# Patient Record
Sex: MALE | Race: BLACK OR AFRICAN AMERICAN
[De-identification: names, ages, dates, MRNs, and addresses within clinical notes are randomized per-mention and may not be internally consistent; named-entity substitution may affect disease eponyms.]

---

## 2018-09-30 ENCOUNTER — HOSPITAL ENCOUNTER (EMERGENCY)
Dept: HOSPITAL 72 - EMR | Age: 23
Discharge: HOME | End: 2018-09-30
Payer: MEDICAID

## 2018-09-30 VITALS — DIASTOLIC BLOOD PRESSURE: 66 MMHG | SYSTOLIC BLOOD PRESSURE: 102 MMHG

## 2018-09-30 VITALS — BODY MASS INDEX: 28.93 KG/M2 | HEIGHT: 66 IN | WEIGHT: 180 LBS

## 2018-09-30 VITALS — DIASTOLIC BLOOD PRESSURE: 80 MMHG | SYSTOLIC BLOOD PRESSURE: 138 MMHG

## 2018-09-30 DIAGNOSIS — K52.9: Primary | ICD-10-CM

## 2018-09-30 LAB
ADD MANUAL DIFF: NO
ALBUMIN SERPL-MCNC: 4.5 G/DL (ref 3.4–5)
ALBUMIN/GLOB SERPL: 1.2 {RATIO} (ref 1–2.7)
ALP SERPL-CCNC: 104 U/L (ref 46–116)
ALT SERPL-CCNC: 36 U/L (ref 12–78)
ANION GAP SERPL CALC-SCNC: 11 MMOL/L (ref 5–15)
APPEARANCE UR: CLEAR
APTT PPP: (no result) S
AST SERPL-CCNC: 20 U/L (ref 15–37)
BASOPHILS NFR BLD AUTO: 0.7 % (ref 0–2)
BILIRUB SERPL-MCNC: 0.8 MG/DL (ref 0.2–1)
BUN SERPL-MCNC: 22 MG/DL (ref 7–18)
CALCIUM SERPL-MCNC: 9.9 MG/DL (ref 8.5–10.1)
CHLORIDE SERPL-SCNC: 101 MMOL/L (ref 98–107)
CO2 SERPL-SCNC: 26 MMOL/L (ref 21–32)
CREAT SERPL-MCNC: 1.2 MG/DL (ref 0.55–1.3)
EOSINOPHIL NFR BLD AUTO: 1.5 % (ref 0–3)
ERYTHROCYTE [DISTWIDTH] IN BLOOD BY AUTOMATED COUNT: 10.4 % (ref 11.6–14.8)
GLOBULIN SER-MCNC: 3.8 G/DL
GLUCOSE UR STRIP-MCNC: NEGATIVE MG/DL
HCT VFR BLD CALC: 52.8 % (ref 42–52)
HGB BLD-MCNC: 17.3 G/DL (ref 14.2–18)
KETONES UR QL STRIP: NEGATIVE
LEUKOCYTE ESTERASE UR QL STRIP: (no result)
LYMPHOCYTES NFR BLD AUTO: 7 % (ref 20–45)
MCV RBC AUTO: 84 FL (ref 80–99)
MONOCYTES NFR BLD AUTO: 6.6 % (ref 1–10)
NEUTROPHILS NFR BLD AUTO: 84.3 % (ref 45–75)
NITRITE UR QL STRIP: NEGATIVE
PH UR STRIP: 9 [PH] (ref 4.5–8)
PLATELET # BLD: 269 K/UL (ref 150–450)
POTASSIUM SERPL-SCNC: 4.1 MMOL/L (ref 3.5–5.1)
PROT UR QL STRIP: (no result)
RBC # BLD AUTO: 6.31 M/UL (ref 4.7–6.1)
SODIUM SERPL-SCNC: 138 MMOL/L (ref 136–145)
SP GR UR STRIP: 1.01 (ref 1–1.03)
UROBILINOGEN UR-MCNC: NORMAL MG/DL (ref 0–1)
WBC # BLD AUTO: 9.4 K/UL (ref 4.8–10.8)

## 2018-09-30 PROCEDURE — 99284 EMERGENCY DEPT VISIT MOD MDM: CPT

## 2018-09-30 PROCEDURE — 85025 COMPLETE CBC W/AUTO DIFF WBC: CPT

## 2018-09-30 PROCEDURE — 80053 COMPREHEN METABOLIC PANEL: CPT

## 2018-09-30 PROCEDURE — 36415 COLL VENOUS BLD VENIPUNCTURE: CPT

## 2018-09-30 PROCEDURE — 81003 URINALYSIS AUTO W/O SCOPE: CPT

## 2018-09-30 PROCEDURE — 83690 ASSAY OF LIPASE: CPT

## 2018-09-30 PROCEDURE — 96374 THER/PROPH/DIAG INJ IV PUSH: CPT

## 2018-09-30 PROCEDURE — 96375 TX/PRO/DX INJ NEW DRUG ADDON: CPT

## 2018-09-30 PROCEDURE — 96361 HYDRATE IV INFUSION ADD-ON: CPT

## 2018-09-30 NOTE — EMERGENCY ROOM REPORT
History of Present Illness


General


Chief Complaint:  Abdominal Pain


Source:  Patient





Present Illness


HPI


23-year-old male presents ED for evaluation.  Complaining of abdominal pain 

with vomiting and diarrhea.  Started 2 days ago.  Pain is cramping, 9 out of 10

, nonradiating.  No focal episodes of vomiting and diarrhea.  Denies fevers or 

chills.  Denies recent travel.  Denies recent antibiotic use.  No other 

aggravating relieving factors.  Denies any other associated symptoms


Allergies:  


Coded Allergies:  


     No Known Allergies (Unverified , 9/30/18)





Patient History


Past Medical History:  none


Past Surgical History:  none


Pertinent Family History:  none


Social History:  Denies: smoking, alcohol use, drug use


Immunizations:  UTD


Reviewed Nursing Documentation:  PMH: Agreed; PSxH: Agreed





Nursing Documentation-PMH


Past Medical History:  No Stated History





Review of Systems


All Other Systems:  negative except mentioned in HPI





Physical Exam





Vital Signs








  Date Time  Temp Pulse Resp B/P (MAP) Pulse Ox O2 Delivery O2 Flow Rate FiO2


 


9/30/18 10:18 98.5 103 20 138/80 96 Room Air  





 98.4       








Sp02 EP Interpretation:  reviewed, normal


General Appearance:  no apparent distress, alert, GCS 15, non-toxic


Head:  normocephalic, atraumatic


Eyes:  bilateral eye normal inspection, bilateral eye PERRL


ENT:  hearing grossly normal, normal pharynx, no angioedema, normal voice


Neck:  full range of motion, supple/symm/no masses


Respiratory:  chest non-tender, lungs clear, normal breath sounds, speaking 

full sentences


Cardiovascular #1:  regular rate, rhythm, no edema


Cardiovascular #2:  2+ carotid (R), 2+ carotid (L), 2+ radial (R), 2+ radial (L)

, 2+ dorsalis pedis (R), 2+ dorsalis pedis (L)


Gastrointestinal:  normal bowel sounds, soft, non-distended, no guarding, no 

rebound, tenderness


Rectal:  deferred


Genitourinary:  normal inspection, no CVA tenderness


Musculoskeletal:  back normal, gait/station normal, normal range of motion, non-

tender


Neurologic:  alert, oriented x3, responsive, motor strength/tone normal, 

sensory intact, speech normal


Psychiatric:  judgement/insight normal, memory normal, mood/affect normal, no 

suicidal/homicidal ideation


Reflexes:  3+ bicep (R), 3+ bicep (L), 3+ tricep (R), 3+ tricep (L), 3+ knee (R)

, 3+ knee (L)


Skin:  normal color, no rash, warm/dry, well hydrated


Lymphatic:  no adenopathy





Medical Decision Making


Diagnostic Impression:  


 Primary Impression:  


 Acute gastroenteritis


ER Course


Hospital Course 


23-year-old M presents to ED with cramping abdominal pain with vomiting, 

diarrhea 





differential diagnosis: gastritis, SBO, cholecystits, gastroenteritis  





Clinical course


Patient placed on stretcher.  On cardiac monitor.  After initial history and 

physical I ordered labs, IV fluids, zofran, pepcid, GI cocktail





Labs - no leukocytosis, electrolytes ok, LFTs normal





Upon reassessment, patient states pain has improved.  findings consistent with 

gastroenteritis 





Discussed findings with patient, mother.  Mother states that patient has been 

having these problems for many years now.  Patient was told by PMD that he may 

need a colonoscopy.  I will provide GI referral





I feel this is a highly complex case requiring extensive working including EKG/

Rhythm strip, Xray/CT/US, Blood/urine lab work, repeat exams while in ED, and 

administration of strong opiates/narcotics for pain control, admission to 

hospital or close patient follow up.  





Diagnosis - gastroenteritis 





Stable and discharged to home with prescriptions for Zantac, zofran, bentyl.  

Followup with PMD.  Return to ED if symptoms recur or worsen





Labs








Test


  9/30/18


10:31


 


White Blood Count


  9.4 K/UL


(4.8-10.8)


 


Red Blood Count


  6.31 M/UL


(4.70-6.10)


 


Hemoglobin


  17.3 G/DL


(14.2-18.0)


 


Hematocrit


  52.8 %


(42.0-52.0)


 


Mean Corpuscular Volume 84 FL (80-99) 


 


Mean Corpuscular Hemoglobin


  27.5 PG


(27.0-31.0)


 


Mean Corpuscular Hemoglobin


Concent 32.8 G/DL


(32.0-36.0)


 


Red Cell Distribution Width


  10.4 %


(11.6-14.8)


 


Platelet Count


  269 K/UL


(150-450)


 


Mean Platelet Volume


  6.9 FL


(6.5-10.1)


 


Neutrophils (%) (Auto)


  84.3 %


(45.0-75.0)


 


Lymphocytes (%) (Auto)


  7.0 %


(20.0-45.0)


 


Monocytes (%) (Auto)


  6.6 %


(1.0-10.0)


 


Eosinophils (%) (Auto)


  1.5 %


(0.0-3.0)


 


Basophils (%) (Auto)


  0.7 %


(0.0-2.0)


 


Urine Color Pale yellow 


 


Urine Appearance Clear 


 


Urine pH 9 (4.5-8.0) 


 


Urine Specific Gravity


  1.015


(1.005-1.035)


 


Urine Protein 1+ (NEGATIVE) 


 


Urine Glucose (UA)


  Negative


(NEGATIVE)


 


Urine Ketones


  Negative


(NEGATIVE)


 


Urine Blood


  Negative


(NEGATIVE)


 


Urine Nitrite


  Negative


(NEGATIVE)


 


Urine Bilirubin


  Negative


(NEGATIVE)


 


Urine Urobilinogen


  Normal MG/DL


(0.0-1.0)


 


Urine Leukocyte Esterase 1+ (NEGATIVE) 


 


Urine RBC 0 /HPF (0 - 0) 


 


Urine WBC


  0-2 /HPF (0 -


0)


 


Urine Squamous Epithelial


Cells Occasional


/LPF


 


Urine Bacteria


  Occasional


/HPF (NONE)


 


Sodium Level


  138 MMOL/L


(136-145)


 


Potassium Level


  4.1 MMOL/L


(3.5-5.1)


 


Chloride Level


  101 MMOL/L


()


 


Carbon Dioxide Level


  26 MMOL/L


(21-32)


 


Anion Gap


  11 mmol/L


(5-15)


 


Blood Urea Nitrogen


  22 mg/dL


(7-18)


 


Creatinine


  1.2 MG/DL


(0.55-1.30)


 


Estimat Glomerular Filtration


Rate > 60 mL/min


(>60)


 


Glucose Level


  114 MG/DL


()


 


Calcium Level


  9.9 MG/DL


(8.5-10.1)


 


Total Bilirubin


  0.8 MG/DL


(0.2-1.0)


 


Aspartate Amino Transf


(AST/SGOT) 20 U/L (15-37) 


 


 


Alanine Aminotransferase


(ALT/SGPT) 36 U/L (12-78) 


 


 


Alkaline Phosphatase


  104 U/L


()


 


Total Protein


  8.3 G/DL


(6.4-8.2)


 


Albumin


  4.5 G/DL


(3.4-5.0)


 


Globulin 3.8 g/dL 


 


Albumin/Globulin Ratio 1.2 (1.0-2.7) 


 


Lipase


  158 U/L


()











Last Vital Signs








  Date Time  Temp Pulse Resp B/P (MAP) Pulse Ox O2 Delivery O2 Flow Rate FiO2


 


9/30/18 11:24 98.4 96 20 102/66 100 Room Air  





 98.4       








Status:  improved


Disposition:  HOME, SELF-CARE


Condition:  Stable


Scripts


Ranitidine Hcl* (ZANTAC*) 150 Mg Tablet


150 MG ORAL TWICE A DAY, #30 TAB


   Prov: Yohan Gongora MD         9/30/18 


Ondansetron Odt* (ZOFRAN ODT*) 4 Mg Tab.rapdis


4 MG BC EVERY 6 HOURS PRN for Nausea & Vomiting, #30 TAB 0 Refills


   Prov: Yohan Gongora MD         9/30/18 


Dicyclomine Hcl* (DICYCLOMINE HCL*) 10 Mg Capsule


10 MG PO QID for 7 Days, CAP


   Prov: Yohan Gongora MD         9/30/18


Referrals:  


Marni Jara MD











NON PHYSICIAN (PCP)











Charles Duvall MD


Patient Instructions:  Viral Gastroenteritis, Adult, Easy-to-Read











Yohan Gongora MD Sep 30, 2018 13:04

## 2019-08-09 ENCOUNTER — HOSPITAL ENCOUNTER (EMERGENCY)
Dept: HOSPITAL 10 - FTE | Age: 24
Discharge: HOME | End: 2019-08-09
Payer: COMMERCIAL

## 2019-08-09 ENCOUNTER — HOSPITAL ENCOUNTER (EMERGENCY)
Dept: HOSPITAL 91 - FTE | Age: 24
Discharge: HOME | End: 2019-08-09
Payer: COMMERCIAL

## 2019-08-09 VITALS
HEIGHT: 67 IN | BODY MASS INDEX: 34.67 KG/M2 | WEIGHT: 220.9 LBS | HEIGHT: 67 IN | WEIGHT: 220.9 LBS | BODY MASS INDEX: 34.67 KG/M2

## 2019-08-09 VITALS — SYSTOLIC BLOOD PRESSURE: 127 MMHG | DIASTOLIC BLOOD PRESSURE: 77 MMHG | RESPIRATION RATE: 18 BRPM | HEART RATE: 71 BPM

## 2019-08-09 DIAGNOSIS — K59.00: ICD-10-CM

## 2019-08-09 DIAGNOSIS — K21.9: Primary | ICD-10-CM

## 2019-08-09 LAB
ADD MAN DIFF?: NO
ALANINE AMINOTRANSFERASE: 68 IU/L (ref 13–69)
ALBUMIN/GLOBULIN RATIO: 1.37
ALBUMIN: 4.8 G/DL (ref 3.3–4.9)
ALKALINE PHOSPHATASE: 93 IU/L (ref 42–121)
AMYLASE: 125 U/L (ref 11–123)
ANION GAP: 10 (ref 5–13)
ASPARTATE AMINO TRANSFERASE: 109 IU/L (ref 15–46)
BASOPHIL #: 0 10^3/UL (ref 0–0.1)
BASOPHILS %: 0.5 % (ref 0–2)
BILIRUBIN,DIRECT: 0 MG/DL (ref 0–0.2)
BILIRUBIN,TOTAL: 0.4 MG/DL (ref 0.2–1.3)
BLOOD UREA NITROGEN: 14 MG/DL (ref 7–20)
CALCIUM: 10.4 MG/DL (ref 8.4–10.2)
CARBON DIOXIDE: 29 MMOL/L (ref 21–31)
CHLORIDE: 102 MMOL/L (ref 97–110)
CREATININE: 1.24 MG/DL (ref 0.61–1.24)
EOSINOPHILS #: 0.4 10^3/UL (ref 0–0.5)
EOSINOPHILS %: 5.8 % (ref 0–7)
GLOBULIN: 3.5 G/DL (ref 1.3–3.2)
GLUCOSE: 94 MG/DL (ref 70–220)
HEMATOCRIT: 47.7 % (ref 42–52)
HEMOGLOBIN: 15.7 G/DL (ref 14–18)
IMMATURE GRANS #M: 0.01 10^3/UL (ref 0–0.03)
IMMATURE GRANS % (M): 0.2 % (ref 0–0.43)
LIPASE: 76 U/L (ref 23–300)
LYMPHOCYTES #: 2.9 10^3/UL (ref 0.8–2.9)
LYMPHOCYTES %: 46.8 % (ref 15–51)
MEAN CORPUSCULAR HEMOGLOBIN: 28 PG (ref 29–33)
MEAN CORPUSCULAR HGB CONC: 32.9 G/DL (ref 32–37)
MEAN CORPUSCULAR VOLUME: 85 FL (ref 82–101)
MEAN PLATELET VOLUME: 10.4 FL (ref 7.4–10.4)
MONOCYTE #: 0.7 10^3/UL (ref 0.3–0.9)
MONOCYTES %: 10.7 % (ref 0–11)
NEUTROPHIL #: 2.2 10^3/UL (ref 1.6–7.5)
NEUTROPHILS %: 36 % (ref 39–77)
NUCLEATED RED BLOOD CELLS #: 0 10^3/UL (ref 0–0)
NUCLEATED RED BLOOD CELLS%: 0 /100WBC (ref 0–0)
OCCULT BLOOD STOOL: NEGATIVE
PLATELET COUNT: 267 10^3/UL (ref 140–415)
POTASSIUM: 4.3 MMOL/L (ref 3.5–5.1)
RED BLOOD COUNT: 5.61 10^6/UL (ref 4.7–6.1)
RED CELL DISTRIBUTION WIDTH: 12 % (ref 11.5–14.5)
SODIUM: 141 MMOL/L (ref 135–144)
TOTAL PROTEIN: 8.3 G/DL (ref 6.1–8.1)
WHITE BLOOD COUNT: 6.2 10^3/UL (ref 4.8–10.8)

## 2019-08-09 PROCEDURE — 80053 COMPREHEN METABOLIC PANEL: CPT

## 2019-08-09 PROCEDURE — 96361 HYDRATE IV INFUSION ADD-ON: CPT

## 2019-08-09 PROCEDURE — 82270 OCCULT BLOOD FECES: CPT

## 2019-08-09 PROCEDURE — 96374 THER/PROPH/DIAG INJ IV PUSH: CPT

## 2019-08-09 PROCEDURE — 82150 ASSAY OF AMYLASE: CPT

## 2019-08-09 PROCEDURE — 85025 COMPLETE CBC W/AUTO DIFF WBC: CPT

## 2019-08-09 PROCEDURE — 83690 ASSAY OF LIPASE: CPT

## 2019-08-09 PROCEDURE — 36415 COLL VENOUS BLD VENIPUNCTURE: CPT

## 2019-08-09 PROCEDURE — 99284 EMERGENCY DEPT VISIT MOD MDM: CPT

## 2019-08-09 RX ADMIN — ALUMINUM HYDROXIDE, MAGNESIUM HYDROXIDE, DIMETHICONE 1 ML: 200; 200; 20 SUSPENSION ORAL at 19:23

## 2019-08-09 RX ADMIN — FAMOTIDINE 1 MG: 10 INJECTION, SOLUTION INTRAVENOUS at 19:23

## 2019-08-09 RX ADMIN — THIAMINE HYDROCHLORIDE 1 MLS/HR: 100 INJECTION, SOLUTION INTRAMUSCULAR; INTRAVENOUS at 19:38

## 2019-08-09 NOTE — ERD
ER Documentation


Chief Complaint


Chief Complaint





ABODINAL PAIN W/RECTAL BLEEDING SINCE 1500





HPI


This is a 24-year-old male with history of GERD presents to the ED complaining 


of "burning" mid epigastric and lower abdominal pain for the past several days. 


He states he has a history of GERD and had an endoscopy over a month ago by his 


GI specialist, he was started on omeprazole for this.  He states he has a 


history of constipation and usually strains when making bowel movements.  He 


states he was making a bowel movement today when he noticed bright red blood on 


toilet paper.  He was concerned about this so he came here for further 


evaluation.   Denies any recent Pepto-Bismol use.  Denies hematochezia or 


melena.  No hematemesis, nausea or vomiting.





ROS


All systems reviewed and are negative except as per history of present illness.





Medications


Home Meds


Active Scripts


Docusate Sodium* (Colace*) 100 Mg Capsule, 100 MG PO TID, #30 CAP


   Prov:ZOILAIGRCHELSY SHAY-C         8/9/19


Hydrocortisone Acetate (Anusol-Hc) 25 Mg Supp.rect, 1 SUPP OR BID PRN for 


HEMORROID PAIN/ITCHING, #12 SUPP.RECT


   Prov:CHELSY AGUAYO PA-C         8/9/19





Allergies


Allergies:  


Coded Allergies:  


     No Known Allergy (Unverified , 8/9/19)





PMhx/Soc


Medical and Surgical Hx:  pt denies Surgical Hx


History of Surgery:  No


Anesthesia Reaction:  No


Hx Neurological Disorder:  No


Hx Respiratory Disorders:  No


Hx Cardiac Disorders:  No


Hx Psychiatric Problems:  No


Hx Miscellaneous Medical Probl:  No


Hx Alcohol Use:  No


Hx Substance Use:  No


Hx Tobacco Use:  No


Smoking Status:  Never smoker





FmHx


Family History:  No diabetes





Physical Exam


Vitals





Vital Signs


  Date      Temp  Pulse  Resp  B/P (MAP)   Pulse Ox  O2          O2 Flow    FiO2


Time                                                 Delivery    Rate


    8/9/19  99.1     84    18      167/86        97


     18:20                          (113)





Physical Exam


Const:   No acute distress


Head:   Atraumatic 


Eyes:    Normal Conjunctiva


ENT:    Normal External Ears, Nose and Mouth.


Neck:               Full range of motion. No meningismus.


Resp:   Clear to auscultation bilaterally


Cardio:   Regular rate and rhythm, no murmurs


Abd:    Soft,+ diffuse abdominal tenderness, especially to the mid epigastric 


and left upper quadrant region.  No rebound, no guarding.  Negative McBurney's, 


negative Germain's. non distended. Normal bowel sounds


 Rectal Exam: Normal tone, No mass, Positive control, no external hemorrhoids, 


no anal fissures


 Stool:    Brown 


 Guaiac:   Negative


Skin:   No petechiae or rashes


Back:   No midline or flank tenderness


Ext:    No cyanosis, or edema


Neur:   Awake and alert


Psych:    Normal Mood and Affect


Result Diagram:  


8/9/19 1920 8/9/19 1920





Results 24 hrs





Laboratory Tests


       Test
                                  8/9/19
19:20  8/9/19
19:30


       White Blood Count                      6.2 10^3/ul


       Red Blood Count                       5.61 10^6/ul


       Hemoglobin                               15.7 g/dl


       Hematocrit                                  47.7 %


       Mean Corpuscular Volume                    85.0 fl


       Mean Corpuscular Hemoglobin                28.0 pg


       Mean Corpuscular Hemoglobin
Concent     32.9 g/dl 
  



       Red Cell Distribution Width                 12.0 %


       Platelet Count                         267 10^3/UL


       Mean Platelet Volume                       10.4 fl


       Immature Granulocytes %                    0.200 %


       Neutrophils %                               36.0 %


       Lymphocytes %                               46.8 %


       Monocytes %                                 10.7 %


       Eosinophils %                                5.8 %


       Basophils %                                  0.5 %


       Nucleated Red Blood Cells %            0.0 /100WBC


       Immature Granulocytes #              0.010 10^3/ul


       Neutrophils #                          2.2 10^3/ul


       Lymphocytes #                          2.9 10^3/ul


       Monocytes #                            0.7 10^3/ul


       Eosinophils #                          0.4 10^3/ul


       Basophils #                            0.0 10^3/ul


       Nucleated Red Blood Cells #            0.0 10^3/ul


       Sodium Level                            141 mmol/L


       Potassium Level                         4.3 mmol/L


       Chloride Level                          102 mmol/L


       Carbon Dioxide Level                     29 mmol/L


       Anion Gap                                       10


       Blood Urea Nitrogen                       14 mg/dl


       Creatinine                              1.24 mg/dl


       Est Glomerular Filtrat Rate
mL/min   > 60 mL/min 
   



       Glucose Level                             94 mg/dl


       Calcium Level                           10.4 mg/dl


       Total Bilirubin                          0.4 mg/dl


       Direct Bilirubin                        0.00 mg/dl


       Indirect Bilirubin                       0.4 mg/dl


       Aspartate Amino Transf
(AST/SGOT)        109 IU/L 
  



       Alanine Aminotransferase
(ALT/SGPT)       68 IU/L 
  



       Alkaline Phosphatase                       93 IU/L


       Total Protein                             8.3 g/dl


       Albumin                                   4.8 g/dl


       Globulin                                 3.50 g/dl


       Albumin/Globulin Ratio                        1.37


       Amylase Level                              125 U/L


       Lipase                                      76 U/L


       Stool Occult Blood                                   NEGATIVE





Current Medications


 Medications
   Dose
          Sig/Jaime
       Start Time
   Status  Last


 (Trade)       Ordered        Route
 PRN     Stop Time              Admin
Dose


                              Reason                                Admin


 Sodium         100 ml @ 
     Q1H STAT
      8/9/19        DC       



Chloride       100 mls/hr     IV
            18:53
 8/9/19


                                             19:35


 Famotidine
    20 mg          ONCE  STAT
    8/9/19        DC            8/9/19


(Pepcid Iv)                   IV
            18:53
 8/9/19                19:23



                                             18:55


                40 ml          ONCE  STAT
    8/9/19        DC            8/9/19


Miscellaneous                 PO
            18:53
 8/9/19                19:23




 Medication
                                18:55


 (Gi Cocktail


(2))


 Sodium         1,000 ml @ 
   Q1H STAT
      8/9/19        DC            8/9/19


Chloride       1,000 mls/hr   IV
            19:33
 8/9/19                19:38



                                             20:32








Procedures/MDM


LABS & DIAGNOSTIC IMAGING:


CBC:      no e/o of systemic infection or severe anemia


CMP:      no e/o severe acidosis, alkalosis, renal failure, diabetic 


ketoacidosis, liver disease


Lipase:      no e/o pancreatitis


Guaiac:      negative 





ED COURSE:


The patient was given IV fluids, Pepcid, GI cocktail


The medication was well tolerated and the patient had market improvement in 


symptoms. 


The patient remained stable throughout ED course.








MEDICAL DECISION MAKING:





This is a 24-year-old male with history of GERD presents with constipation and 


bright red blood per rectum.  Work-up shows no evidence of infection, 


dehydration or anemia.  He has no evidence of active bleeding  at this time.  


His guaiac negative, suspect his rectal bleeding is related to hemorrhoids 


secondary to constipation.  We will treat with a trial of topical steroids and 


stool softeners.  He has an appointment with GI specialist next week for 


endoscopy and colonoscopy.  He did have exacerbation of his GERD which was 


treated w/ IV fluids and GI cocktail.  There is no evidence of upper or lower GI


bleeding at this time.  Patient is hemodynamically stable and discharged home 


with strict return precautions.








PRESCRIPTIONS: Preparation H, Colace








SPECIALIST FOLLOW UP RECOMMENDED: GI specialist





Departure


Diagnosis:  


   Primary Impression:  


   GERD (gastroesophageal reflux disease)


   Esophagitis presence:  without esophagitis  Qualified Codes:  K21.9 - Gastro-


   esophageal reflux disease without esophagitis


   Additional Impression:  


   Constipation


   Constipation type:  unspecified constipation type  Qualified Codes:  K59.00 -


   Constipation, unspecified


Condition:  Stable


Patient Instructions:  Constipation (Adult)


Referrals:  


[facility]


YOU HAVE RECEIVED A MEDICAL SCREENING EXAM AND THE RESULTS INDICATE THAT YOU DO 


NOT HAVE A CONDITION THAT REQUIRES URGENT TREATMENT IN THE EMERGENCY DEPARTMENT.





FURTHER EVALUATION AND TREATMENT OF YOUR CONDITION CAN WAIT UNTIL YOU ARE SEEN 


IN YOUR DOCTORS OFFICE WITHIN THE NEXT 1-2 DAYS. IT IS YOUR RESPONSIBILITY TO 


MAKE AN APPOINTMENT FOR FOLOW-UP CARE.





IF YOU HAVE A PRIMARY DOCTOR


--you should call your primary doctor and schedule an appointment





IF YOU DO NOT HAVE A PRIMARY DOCTOR YOU CAN CALL OUR PHYSICIAN REFERRAL HOTLINE 


AT


 [phone] 





IF YOU CAN NOT AFFORD TO SEE A PHYSICIAN YOU CAN CHOSE FROM THE FOLLOWING 


[facility] [phone] [address] [facility]. [facility] [phone] [address] [facility]. [facility] [phone] [facility] [facility]. [facility] [phone] [address] [facility]. [facility] [phone] [address] [facility]. [facility] [phone] [address] [facility]. [facility]


YOU HAVE RECEIVED A MEDICAL SCREENING EXAM AND THE RESULTS INDICATE THAT YOU DO 


NOT HAVE A CONDITION THAT REQUIRES URGENT TREATMENT IN THE EMERGENCY DEPARTMENT.





FURTHER EVALUATION AND TREATMENT OF YOUR CONDITION CAN WAIT UNTIL YOU ARE SEEN 


IN YOUR DOCTORS OFFICE WITHIN THE NEXT 1-2 DAYS. IT IS YOUR RESPONSIBILITY TO 


MAKE AN APPOINTMENT FOR FOLOW-UP CARE.





IF YOU HAVE A PRIMARY DOCTOR


--you should call your primary doctor and schedule and appointment





IF YOU DO NOT HAVE A PRIMARY DOCTOR YOU CAN CALL OUR PHYSICIAN REFERRAL HOTLINE 


AT [phone].





IF YOU CAN NOT AFFORD TO SEE A PHYSICIAN YOU CAN CHOSE FROM THE FOLLOWING [facility]:





[facility]


[address] [place] [address]





[facility]


[address] [place] [address]





[facility]


[address] [place] [address]





Additional Instructions:  


Aloe up with your GI specialist next week as scheduled.  He can take copies of 


your lab report with you.  Return here for any worsening pain or symptoms.  If 


you take the medications I am prescribing you as needed to help relieve your 


symptoms.











CHELSY AGUAYO PA-C      Aug 9, 2019 20:47

## 2019-09-17 ENCOUNTER — HOSPITAL ENCOUNTER (EMERGENCY)
Dept: HOSPITAL 10 - FTE | Age: 24
Discharge: HOME | End: 2019-09-17
Payer: COMMERCIAL

## 2019-09-17 ENCOUNTER — HOSPITAL ENCOUNTER (EMERGENCY)
Dept: HOSPITAL 91 - FTE | Age: 24
Discharge: HOME | End: 2019-09-17
Payer: COMMERCIAL

## 2019-09-17 VITALS — HEART RATE: 82 BPM | RESPIRATION RATE: 20 BRPM | SYSTOLIC BLOOD PRESSURE: 126 MMHG | DIASTOLIC BLOOD PRESSURE: 60 MMHG

## 2019-09-17 VITALS — HEIGHT: 60 IN | WEIGHT: 220.46 LBS | BODY MASS INDEX: 43.28 KG/M2

## 2019-09-17 DIAGNOSIS — S81.811A: Primary | ICD-10-CM
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
## 2019-09-17 PROCEDURE — 90715 TDAP VACCINE 7 YRS/> IM: CPT

## 2019-09-17 PROCEDURE — 12001 RPR S/N/AX/GEN/TRNK 2.5CM/<: CPT

## 2019-09-17 PROCEDURE — 99282 EMERGENCY DEPT VISIT SF MDM: CPT

## 2019-09-17 RX ADMIN — CLOSTRIDIUM TETANI TOXOID ANTIGEN (FORMALDEHYDE INACTIVATED), CORYNEBACTERIUM DIPHTHERIAE TOXOID ANTIGEN (FORMALDEHYDE INACTIVATED), BORDETELLA PERTUSSIS TOXOID ANTIGEN (GLUTARALDEHYDE INACTIVATED), BORDETELLA PERTUSSIS FILAMENTOUS HEMAGGLUTININ ANTIGEN (FORMALDEHYDE INACTIVATED), BORDETELLA PERTUSSIS PERTACTIN ANTIGEN, AND BORDETELLA PERTUSSIS FIMBRIAE 2/3 ANTIGEN 1 ML: 5; 2; 2.5; 5; 3; 5 INJECTION, SUSPENSION INTRAMUSCULAR at 11:56

## 2019-09-17 RX ADMIN — LIDOCAINE HYDROCHLORIDE 1 ML: 10 INJECTION, SOLUTION EPIDURAL; INFILTRATION; INTRACAUDAL; PERINEURAL at 10:52

## 2019-09-24 ENCOUNTER — HOSPITAL ENCOUNTER (EMERGENCY)
Dept: HOSPITAL 10 - E/R | Age: 24
Discharge: HOME | End: 2019-09-24
Payer: COMMERCIAL

## 2019-09-24 ENCOUNTER — HOSPITAL ENCOUNTER (EMERGENCY)
Dept: HOSPITAL 91 - E/R | Age: 24
Discharge: HOME | End: 2019-09-24
Payer: COMMERCIAL

## 2019-09-24 VITALS
HEIGHT: 65 IN | WEIGHT: 226.19 LBS | BODY MASS INDEX: 37.69 KG/M2 | BODY MASS INDEX: 37.69 KG/M2 | HEIGHT: 65 IN | WEIGHT: 226.19 LBS

## 2019-09-24 VITALS — SYSTOLIC BLOOD PRESSURE: 162 MMHG | DIASTOLIC BLOOD PRESSURE: 87 MMHG | HEART RATE: 86 BPM | RESPIRATION RATE: 18 BRPM

## 2019-09-24 DIAGNOSIS — Z48.01: Primary | ICD-10-CM

## 2019-09-24 PROCEDURE — 99281 EMR DPT VST MAYX REQ PHY/QHP: CPT

## 2021-12-21 ENCOUNTER — OFFICE (OUTPATIENT)
Dept: URBAN - METROPOLITAN AREA CLINIC 33 | Facility: CLINIC | Age: 26
End: 2021-12-21

## 2021-12-21 VITALS
SYSTOLIC BLOOD PRESSURE: 157 MMHG | WEIGHT: 220 LBS | HEIGHT: 65 IN | DIASTOLIC BLOOD PRESSURE: 88 MMHG | TEMPERATURE: 97.8 F

## 2021-12-21 DIAGNOSIS — K62.5 RECTAL BLEEDING: ICD-10-CM

## 2021-12-21 DIAGNOSIS — K59.01 CONSTIPATION, SLOW TRANSIT: ICD-10-CM

## 2021-12-21 PROCEDURE — 99205 OFFICE O/P NEW HI 60 MIN: CPT | Performed by: SPECIALIST

## 2021-12-21 NOTE — SERVICEHPINOTES
Had colonoscopy one year ago, poor prep. This patient is seen for evaluation of blood per rectum.   Symptoms began   1     year   ago.   Bleeding has occurred at a frequency of   1   times per   month  .    The patient estimates seeing   a teaspoon   of   bright red   blood in the stool.    Stools have been   hard   in consistency.    Has noted    in association with the bleeding.   Previous pertinent conditions that have been diagnosed in relation to this problem include   none  .    The patient has tried    for empiric treatment of suspected hemorrhoids.    Prior notable interventions/surgeries include   .    A colonoscopy was performed    ago.  Findings from that exam included   no abnormal findings  .     BRThe patient is seen for evaluation of constipation.    Notes the onset of constipation   several  years   ago.  Symptoms started   abruptly  .   Currently, the patient evacuates stool   1   times per   week  .    The stools are   hard  .   They are typically   brown   in color.    Associated symptoms include   alternating bowel pattern  .  Symptoms are alleviated with   nothing specific  .  The patient has previously medicated symptoms with   .   The patient has undergone a colonoscopy    ago.  Findings on that exam included   .

## 2022-01-21 ENCOUNTER — AMBULATORY SURGICAL CENTER (OUTPATIENT)
Dept: URBAN - METROPOLITAN AREA SURGERY 24 | Facility: SURGERY | Age: 27
End: 2022-01-21

## 2022-01-21 VITALS
RESPIRATION RATE: 17 BRPM | OXYGEN SATURATION: 98 % | HEIGHT: 65 IN | TEMPERATURE: 97.3 F | DIASTOLIC BLOOD PRESSURE: 98 MMHG | SYSTOLIC BLOOD PRESSURE: 157 MMHG | HEART RATE: 94 BPM | WEIGHT: 220 LBS

## 2022-01-21 DIAGNOSIS — K44.9 DIAPHRAGMATIC HERNIA WITHOUT OBSTRUCTION OR GANGRENE: ICD-10-CM

## 2022-01-21 DIAGNOSIS — K22.89 OTHER SPECIFIED DISEASE OF ESOPHAGUS: ICD-10-CM

## 2022-01-21 PROBLEM — K57.30 DVRTCLOS OF LG INT W/O PERFORATION OR ABSCESS W/O BLEEDING: Status: ACTIVE | Noted: 2022-01-21

## 2022-01-21 PROBLEM — K63.5 POLYP OF COLON: Status: ACTIVE | Noted: 2022-01-21

## 2022-01-21 PROBLEM — R11.2 NAUSEA & VOMITING: Status: ACTIVE | Noted: 2022-01-21

## 2022-01-21 PROCEDURE — 45385 COLONOSCOPY W/LESION REMOVAL: CPT | Performed by: SPECIALIST

## 2022-01-21 PROCEDURE — 45380 COLONOSCOPY AND BIOPSY: CPT | Mod: 59 | Performed by: SPECIALIST

## 2022-01-21 PROCEDURE — 43239 EGD BIOPSY SINGLE/MULTIPLE: CPT | Performed by: SPECIALIST

## 2022-01-21 RX ORDER — OMEPRAZOLE 40 MG/1
40 CAPSULE, DELAYED RELEASE ORAL
Qty: 30 | Status: ACTIVE
Start: 2022-01-21

## 2022-02-10 ENCOUNTER — OFFICE (OUTPATIENT)
Dept: URBAN - METROPOLITAN AREA CLINIC 33 | Facility: CLINIC | Age: 27
End: 2022-02-10

## 2022-02-10 VITALS — HEIGHT: 65 IN

## 2022-02-10 DIAGNOSIS — K92.1 HEMATOCHEZIA: ICD-10-CM

## 2022-02-10 DIAGNOSIS — K21.9 GERD: ICD-10-CM

## 2022-02-10 DIAGNOSIS — K64.8 HEMORRHOIDS, INTERNAL W/ BLEEDING: ICD-10-CM

## 2022-02-10 PROCEDURE — 99214 OFFICE O/P EST MOD 30 MIN: CPT | Performed by: SPECIALIST

## 2022-02-10 PROCEDURE — G0406 INPT/TELE FOLLOW UP 15: HCPCS | Performed by: SPECIALIST
